# Patient Record
Sex: MALE | Race: BLACK OR AFRICAN AMERICAN | NOT HISPANIC OR LATINO
[De-identification: names, ages, dates, MRNs, and addresses within clinical notes are randomized per-mention and may not be internally consistent; named-entity substitution may affect disease eponyms.]

---

## 2017-01-06 ENCOUNTER — APPOINTMENT (OUTPATIENT)
Dept: PULMONOLOGY | Facility: CLINIC | Age: 62
End: 2017-01-06

## 2017-03-24 ENCOUNTER — APPOINTMENT (OUTPATIENT)
Dept: PULMONOLOGY | Facility: CLINIC | Age: 62
End: 2017-03-24

## 2019-08-16 ENCOUNTER — EMERGENCY (EMERGENCY)
Facility: HOSPITAL | Age: 64
LOS: 1 days | Discharge: HOME | End: 2019-08-16
Attending: EMERGENCY MEDICINE | Admitting: EMERGENCY MEDICINE
Payer: MEDICARE

## 2019-08-16 VITALS
HEIGHT: 68 IN | WEIGHT: 190.04 LBS | DIASTOLIC BLOOD PRESSURE: 105 MMHG | TEMPERATURE: 98 F | RESPIRATION RATE: 18 BRPM | OXYGEN SATURATION: 98 % | SYSTOLIC BLOOD PRESSURE: 180 MMHG | HEART RATE: 81 BPM

## 2019-08-16 VITALS — HEART RATE: 62 BPM | DIASTOLIC BLOOD PRESSURE: 92 MMHG | SYSTOLIC BLOOD PRESSURE: 175 MMHG

## 2019-08-16 PROCEDURE — 73080 X-RAY EXAM OF ELBOW: CPT | Mod: 26,LT

## 2019-08-16 PROCEDURE — 73080 X-RAY EXAM OF ELBOW: CPT

## 2019-08-16 PROCEDURE — 96372 THER/PROPH/DIAG INJ SC/IM: CPT

## 2019-08-16 PROCEDURE — 73080 X-RAY EXAM OF ELBOW: CPT | Mod: 26

## 2019-08-16 PROCEDURE — 99284 EMERGENCY DEPT VISIT MOD MDM: CPT | Mod: 25

## 2019-08-16 PROCEDURE — 99283 EMERGENCY DEPT VISIT LOW MDM: CPT | Mod: 25

## 2019-08-16 RX ORDER — ACETAMINOPHEN 500 MG
1000 TABLET ORAL ONCE
Refills: 0 | Status: COMPLETED | OUTPATIENT
Start: 2019-08-16 | End: 2019-08-16

## 2019-08-16 RX ORDER — IBUPROFEN 200 MG
1 TABLET ORAL
Qty: 30 | Refills: 0
Start: 2019-08-16 | End: 2019-08-25

## 2019-08-16 RX ORDER — LIDOCAINE 4 G/100G
1 CREAM TOPICAL ONCE
Refills: 0 | Status: COMPLETED | OUTPATIENT
Start: 2019-08-16 | End: 2019-08-16

## 2019-08-16 RX ORDER — KETOROLAC TROMETHAMINE 30 MG/ML
30 SYRINGE (ML) INJECTION ONCE
Refills: 0 | Status: DISCONTINUED | OUTPATIENT
Start: 2019-08-16 | End: 2019-08-16

## 2019-08-16 RX ADMIN — LIDOCAINE 1 PATCH: 4 CREAM TOPICAL at 08:13

## 2019-08-16 RX ADMIN — Medication 1000 MILLIGRAM(S): at 08:14

## 2019-08-16 RX ADMIN — Medication 30 MILLIGRAM(S): at 08:13

## 2019-08-16 RX ADMIN — Medication 30 MILLIGRAM(S): at 08:30

## 2019-08-16 NOTE — ED ADULT NURSE NOTE - CHPI ED NUR SYMPTOMS NEG
no dizziness/no weakness/no chills/no tingling/no fever/no nausea/no vomiting/no decreased eating/drinking

## 2019-08-16 NOTE — ED PROVIDER NOTE - CHIEF COMPLAINT
The patient is a 63y Male pmhx bph, htn, RA (on methotrexate) presenting to the ed with left arm pain.

## 2019-08-16 NOTE — ED ADULT NURSE REASSESSMENT NOTE - NS ED NURSE REASSESS COMMENT FT1
pt is awake, pain reassessment after pain med, pt states pain "is little bit better", pain increases with movement. pt is taken to X ray

## 2019-08-16 NOTE — ED ADULT TRIAGE NOTE - CHIEF COMPLAINT QUOTE
Presents to ED for Left arm pain and swelling.  Patient reports pain began 4 days ago and has worsened.  Denies CP, SOB, abd pain, numbness, tingling.  Patient has history of HTN and has not yet taken Carvedilol, and Amlodipine.

## 2019-08-16 NOTE — ED PROVIDER NOTE - PHYSICAL EXAMINATION
General: Patient is well developed and well nourised. Patient is alert and oriented to person, place and date. Patient is laying comfortably in stretcher and appears in no acute distress.  HEENT: Head is normocephalic and atraumatic. Pupils are equal, round and reactive. Extraocular movements intact. No evidence of nystagmus, conjunctival injection, or scleral icterus. External ears symmetric without evidence of discharge.  Nose is symmetric, non-tender, patent without evidence of discharge. Teeth in good repair. Uvula midline.   Neck: Supple with no evidence of lymphadenopathy.  Full range of motion.  Heart: Regular rate and rhythm. No murmurs, rubs or gallops.   Lungs: Clear to auscultation bilaterally with equal chest expansion. No note of wheezes, rhonchi, rales. Equal chest expansion. No note of retractions.  Musculoskeletal: decreased rom of the left elbow with flexion 2/2 pain. no point ttp. no edema of the elbow.  strength strongth, radial pulses 2+. distal sensation intact. ttp left cervical paraspinal muscles with spasm, no spinal ttp.  No edema, erythema, ecchymosis, atrophy or deformity. Full range of motion in all four extremities.  No clubbing or cyanosis. No point tenderness to palpation.   Neuro: GCS 15. Moving all extremities without discomfort. Strength is 5/5 arms and legs bilaterally. Sensation intact in all four extremities. gait steady   Skin: Warm, dry and intact without evidence of rashes, bruising, pallor, jaundice or cyanosis.   Psych: Mood and affect appropriate.

## 2019-08-16 NOTE — ED ADULT NURSE NOTE - PMH
HTN (hypertension)    Hypertension    Prediabetes    RA (rheumatoid arthritis)    Rheumatoid arthritis

## 2019-08-16 NOTE — ED ADULT NURSE NOTE - OBJECTIVE STATEMENT
Patient came in to ED c/o L arm swelling and L shoulder pain. Denies trauma. Denies CP, no SOB. No other c/o voiced. Per patient swelling and pain started last week with no improvement.

## 2019-08-16 NOTE — ED PROVIDER NOTE - ATTENDING CONTRIBUTION TO CARE
63M hx of RA on methotrexate, htn, bph, here today w/ 1 week of atraumatic L elbow pain and L neck pain. no injury/trauma, no numbness/tingling, no weakness. sensation intact, radial pulses intact, ROM intact, +pain w/ flexion of elbow, no deformities or swelling seen, no bony tenderness. xr shows osteophyte. plan for pcp/ortho follow up. doubt septic joint at this time.

## 2019-08-16 NOTE — ED PROVIDER NOTE - CLINICAL SUMMARY MEDICAL DECISION MAKING FREE TEXT BOX
This is a pleasant 63 year old male pmhx RA, HTN, BPH presenting to the ed with 8 days of atraumatic left elbow pain. states he has been taking mtx, prednisone and aspirin without relief of symptoms. physical exma, patient appears well, non-toxic. pain with flexion at the left elbow. full ROM of the left shoulder. ttp left certvical paraspinal muscles. plan for medication and imaging of the left elbow. This is a pleasant 63 year old male pmhx RA, HTN, BPH presenting to the ed with 8 days of atraumatic left elbow pain. states he has been taking mtx, prednisone and aspirin without relief of symptoms. no fevers.  physical exam, patient appears well, non-toxic. pain with flexion at the left elbow. full ROM of the left shoulder. ttp left certvical paraspinal muscles. plan for medication and imaging of the left elbow which shows a joint effusion, no fracture. given no fevers and ability to range the elbow I believe that the patient is stable for d/c home. I do not believe aspiration necessitated at this time. recommend follow up with ortho, and rheumatology. At this time, the evidence for any other entities in the differential is insufficient to justify any further testing. This was discussed and explained to the patient. It was advised that persistent or worsening symptoms would require further evaluation. This was discussed with the patient and family using shared decision making. ED evaluation and management discussed with the patient and family (if available) in detail.  Close PMD follow up encouraged.  Strict ED return instructions discussed in detail and patient given the opportunity to ask any questions about their discharge diagnosis and instructions. Patient verbalized understanding. Patient is agreeable to plan.

## 2019-08-16 NOTE — ED PROVIDER NOTE - NSFOLLOWUPINSTRUCTIONS_ED_ALL_ED_FT
please take ibuprofen for pain as prescribed    please take tylenol extra strength for pain as well    please follow up with your rheumatologist     please come back to er for any worsening of symptoms, worsening pain, inability to bend at the elbow or fevers    Joint Pain  Image   Joint pain can be caused by many things. It is likely to go away if you follow instructions from your doctor for taking care of yourself at home. Sometimes, you may need more treatment.    Follow these instructions at home:  Managing pain, stiffness, and swelling     Image   If told, put ice on the painful area.  Put ice in a plastic bag.  Place a towel between your skin and the bag.  Leave the ice on for 20 minutes, 2–3 times a day.  If told, put heat on the painful area. Do this as often as told by your doctor. Use the heat source that your doctor recommends, such as a moist heat pack or a heating pad.  Place a towel between your skin and the heat source.  Leave the heat on for 20–30 minutes.  Take off the heat if your skin gets bright red. This is especially important if you are unable to feel pain, heat, or cold. You may have a greater risk of getting burned.  Move your fingers or toes below the painful joint often. This helps with stiffness and swelling.  If possible, raise (elevate) the painful joint above the level of your heart while you are sitting or lying down. To do this, try putting a few pillows under the painful joint.  Activity     Rest the painful joint for as long as told. Do not do things that cause pain or make your pain worse.  Begin exercising or stretching the affected area, as told by your doctor. Ask your doctor what types of exercise are safe for you.  If you have an elastic bandage, sling, or splint:     Wear the device as told by your doctor. Take it off only as told by your doctor.  Loosen the device if your fingers or toes below the joint:  Tingle.   Lose feeling (get numb).   Get cold and blue.  Keep the device clean.   Ask your doctor if you should take off the device before bathing. You may need to cover it with a watertight covering when you take a bath or a shower.  General instructions     Take over-the-counter and prescription medicines only as told by your doctor.  Do not use any products that contain nicotine or tobacco. These include cigarettes and e-cigarettes. If you need help quitting, ask your doctor.  Keep all follow-up visits as told by your doctor. This is important.  Contact a doctor if:  You have pain that gets worse and does not get better with medicine.  Your joint pain does not get better in 3 days.  You have more bruising or swelling.  You have a fever.  You lose 10 lb (4.5 kg) or more without trying.  Get help right away if:  You cannot move the joint.  Your fingers or toes tingle, lose feeling, or get cold and blue.  You have a fever along with a joint that is red, warm, and swollen.  Summary  Joint pain can be caused by many things. It often goes away if you follow instructions from your doctor for taking care of yourself at home.  Rest the painful joint for as long as told. Do not do things that cause pain or make your pain worse.  Take over-the-counter and prescription medicines only as told by your doctor.  This information is not intended to replace advice given to you by your health care provider. Make sure you discuss any questions you have with your health care provider.    Document Released: 12/06/2010 Document Revised: 10/03/2018 Document Reviewed: 10/03/2018  [employer] Interactive Patient Education © 2019 [employer]

## 2019-08-16 NOTE — ED PROVIDER NOTE - DIAGNOSTIC INTERPRETATION
ER Physician Assistant:  INTERPRETATION: no acute fracture; joint effusion no soft tissue swelling noted; normal bony alignment.

## 2019-08-16 NOTE — ED PROVIDER NOTE - CARE PROVIDER_API CALL
Gregorio Butler)  Orthopaedic Surgery  [address] [place] [address]  Phone: [phone]  Fax: [phone]  Follow Up Time:

## 2019-08-16 NOTE — ED PROVIDER NOTE - OBJECTIVE STATEMENT
states that the pain began last week. states that he was seen by his PMD who started him on prednisone in addition to MTX. states that he has been taking aspirin for pain. no numbness tingling weakness, injury or trauma. states that the pain is also him his back. does not endorse chest pain palpitations cough shortness of breath nausea vomiting or diarrhea. no pain elsewhere on the body.

## 2019-08-20 DIAGNOSIS — M54.9 DORSALGIA, UNSPECIFIED: ICD-10-CM

## 2019-08-20 DIAGNOSIS — M79.602 PAIN IN LEFT ARM: ICD-10-CM

## 2019-08-20 DIAGNOSIS — I10 ESSENTIAL (PRIMARY) HYPERTENSION: ICD-10-CM

## 2019-08-20 DIAGNOSIS — M25.522 PAIN IN LEFT ELBOW: ICD-10-CM

## 2019-08-20 DIAGNOSIS — Z79.899 OTHER LONG TERM (CURRENT) DRUG THERAPY: ICD-10-CM

## 2023-01-02 ENCOUNTER — EMERGENCY (EMERGENCY)
Facility: HOSPITAL | Age: 68
LOS: 1 days | Discharge: HOME | End: 2023-01-02
Admitting: EMERGENCY MEDICINE
Payer: MEDICARE

## 2023-01-02 VITALS
DIASTOLIC BLOOD PRESSURE: 80 MMHG | HEART RATE: 65 BPM | OXYGEN SATURATION: 99 % | WEIGHT: 179.9 LBS | RESPIRATION RATE: 18 BRPM | SYSTOLIC BLOOD PRESSURE: 138 MMHG | TEMPERATURE: 97 F | HEIGHT: 68 IN

## 2023-01-02 LAB
FLUAV AG NPH QL: SIGNIFICANT CHANGE UP
FLUBV AG NPH QL: SIGNIFICANT CHANGE UP
RSV RNA NPH QL NAA+NON-PROBE: SIGNIFICANT CHANGE UP
SARS-COV-2 RNA SPEC QL NAA+PROBE: SIGNIFICANT CHANGE UP

## 2023-01-02 PROCEDURE — 99283 EMERGENCY DEPT VISIT LOW MDM: CPT

## 2023-01-02 PROCEDURE — 87637 SARSCOV2&INF A&B&RSV AMP PRB: CPT

## 2023-01-02 PROCEDURE — 99284 EMERGENCY DEPT VISIT MOD MDM: CPT

## 2023-01-02 RX ORDER — FLUTICASONE PROPIONATE 50 MCG
1 SPRAY, SUSPENSION NASAL
Qty: 1 | Refills: 0
Start: 2023-01-02

## 2023-01-02 RX ORDER — DEXTROMETHORPHAN/PSEUDOEPHED 7.5-15MG/5
1 SYRUP ORAL
Qty: 15 | Refills: 0
Start: 2023-01-02

## 2023-01-02 NOTE — ED ADULT TRIAGE NOTE - ESI TRIAGE ACUITY LEVEL, MLM
4 Cyclophosphamide Pregnancy And Lactation Text: This medication is Pregnancy Category D and it isn't considered safe during pregnancy. This medication is excreted in breast milk.

## 2023-01-02 NOTE — ED PROVIDER NOTE - NSICDXPASTMEDICALHX_GEN_ALL_CORE_FT
PAST MEDICAL HISTORY:  HTN (hypertension)     Hypertension     Prediabetes     RA (rheumatoid arthritis)     Rheumatoid arthritis

## 2023-01-02 NOTE — ED PROVIDER NOTE - PHYSICAL EXAMINATION
Vitals reviewed  Gen: well appearing, nad, speaking in full sentences  Skin: wwp, no rash/lesions  HEENT: ncat, no sinus ttp, eomi, mmm, normal posterior OP, b/l TMs w/ effusion, no bulging or erythema   CV: rrr, no audible m/r/g  Resp: symmetrical expansion, ctab, no w/r/r  Ext: FROM throughout, no peripheral edema  Neuro: alert/oriented, no focal deficits, steady gait

## 2023-01-02 NOTE — ED PROVIDER NOTE - OBJECTIVE STATEMENT
67 M pmh htn, RA p/w nasal congestion x one week.  pt reports nasal congestion w/ thick whitish/yellow dc when he blows his nose hard.  also w/ pressure in both ears and difficulty breathing through his nose.  no chest pain/sob/cough or sore throat.  pt tried afrin w/o relief.  denies f/c, headache, dizziness, fainting, abd pain, nv, sick contacts, travel, trauma. pt fully vaccinated for covid/flu

## 2023-01-02 NOTE — ED ADULT NURSE NOTE - CHIEF COMPLAINT QUOTE
Patient c/o of nasal congestion, stuffy nose X 1 week, states having a hard time breathing, no fever or chills, no cough.  No difficulty speaking in long sentences. O2 sat 99% on RA.

## 2023-01-02 NOTE — ED PROVIDER NOTE - PATIENT PORTAL LINK FT
You can access the FollowMyHealth Patient Portal offered by [facility] by registering at the following website: [email or web address]. By joining [facility]’s FollowMyHealth portal, you will also be able to view your health information using other applications (apps) compatible with our system.

## 2023-01-02 NOTE — ED PROVIDER NOTE - CLINICAL SUMMARY MEDICAL DECISION MAKING FREE TEXT BOX
67 M pmh htn, RA p/w nasal congestion x one week.  pt afebrile, well appearing, no sinus ttp, b/l TMs w/ effusion, no bulging or erythema, normal posterior OP, lungs ctab, hrrr.  suspect viral sinusitis.  viral swab sent.  will dc w/ flonase, decongestant and supportive care.  discussed strict return parameters

## 2023-01-02 NOTE — ED PROVIDER NOTE - NSFOLLOWUPINSTRUCTIONS_ED_ALL_ED_FT
Use nasal spray as prescribed    Take decongestants as prescribed.    Please rest and remain well hydrated with plenty of fluids.  You can take motrin 600-800mg and tylenol 650mg every 3 hours, switching between the two for pain/bodyaches or fevers (>100.4F/>38C)    You can use over the counter nasal saline rinses or steam in hot shower to help congestion     Please call to arrange follow up with primary care doctor within one week    Sinusitis, Adult       Sinusitis is inflammation of your sinuses. Sinuses are hollow spaces in the bones around your face. Your sinuses are located:  •Around your eyes.      •In the middle of your forehead.      •Behind your nose.      •In your cheekbones.      Mucus normally drains out of your sinuses. When your nasal tissues become inflamed or swollen, mucus can become trapped or blocked. This allows bacteria, viruses, and fungi to grow, which leads to infection. Most infections of the sinuses are caused by a virus.    Sinusitis can develop quickly. It can last for up to 4 weeks (acute) or for more than 12 weeks (chronic). Sinusitis often develops after a cold.      What are the causes?    This condition is caused by anything that creates swelling in the sinuses or stops mucus from draining. This includes:  •Allergies.      •Asthma.      •Infection from bacteria or viruses.      •Deformities or blockages in your nose or sinuses.      •Abnormal growths in the nose (nasal polyps).      •Pollutants, such as chemicals or irritants in the air.      •Infection from fungi (rare).        What increases the risk?    You are more likely to develop this condition if you:  •Have a weak body defense system (immune system).      •Do a lot of swimming or diving.      •Overuse nasal sprays.      •Smoke.        What are the signs or symptoms?    The main symptoms of this condition are pain and a feeling of pressure around the affected sinuses. Other symptoms include:  •Stuffy nose or congestion.      •Thick drainage from your nose.      •Swelling and warmth over the affected sinuses.      •Headache.      •Upper toothache.      •A cough that may get worse at night.      •Extra mucus that collects in the throat or the back of the nose (postnasal drip).      •Decreased sense of smell and taste.      •Fatigue.      •A fever.      •Sore throat.      •Bad breath.        How is this diagnosed?    This condition is diagnosed based on:  •Your symptoms.      •Your medical history.      •A physical exam.    •Tests to find out if your condition is acute or chronic. This may include:  •Checking your nose for nasal polyps.      •Viewing your sinuses using a device that has a light (endoscope).      •Testing for allergies or bacteria.      •Imaging tests, such as an MRI or CT scan.        In rare cases, a bone biopsy may be done to rule out more serious types of fungal sinus disease.      How is this treated?    Treatment for sinusitis depends on the cause and whether your condition is chronic or acute.•If caused by a virus, your symptoms should go away on their own within 10 days. You may be given medicines to relieve symptoms. They include:  •Medicines that shrink swollen nasal passages (topical intranasal decongestants).       •Medicines that treat allergies (antihistamines).      •A spray that eases inflammation of the nostrils (topical intranasal corticosteroids).      •Rinses that help get rid of thick mucus in your nose (nasal saline washes).      •If caused by bacteria, your health care provider may recommend waiting to see if your symptoms improve. Most bacterial infections will get better without antibiotic medicine. You may be given antibiotics if you have:  •A severe infection.       •A weak immune system.        •If caused by narrow nasal passages or nasal polyps, you may need to have surgery.        Follow these instructions at home:    Medicines     •Take, use, or apply over-the-counter and prescription medicines only as told by your health care provider. These may include nasal sprays.      •If you were prescribed an antibiotic medicine, take it as told by your health care provider. Do not stop taking the antibiotic even if you start to feel better.        Hydrate and humidify      •Drink enough fluid to keep your urine pale yellow. Staying hydrated will help to thin your mucus.      •Use a cool mist humidifier to keep the humidity level in your home above 50%.      •Inhale steam for 10–15 minutes, 3–4 times a day, or as told by your health care provider. You can do this in the bathroom while a hot shower is running.      •Limit your exposure to cool or dry air.      Rest     •Rest as much as possible.      •Sleep with your head raised (elevated).      •Make sure you get enough sleep each night.        General instructions      •Apply a warm, moist washcloth to your face 3–4 times a day or as told by your health care provider. This will help with discomfort.      •Wash your hands often with soap and water to reduce your exposure to germs. If soap and water are not available, use hand .      • Do not smoke. Avoid being around people who are smoking (secondhand smoke).      •Keep all follow-up visits as told by your health care provider. This is important.        Contact a health care provider if:    •You have a fever.      •Your symptoms get worse.      •Your symptoms do not improve within 10 days.        Get help right away if:    •You have a severe headache.      •You have persistent vomiting.      •You have severe pain or swelling around your face or eyes.      •You have vision problems.      •You develop confusion.      •Your neck is stiff.      •You have trouble breathing.        Summary    •Sinusitis is soreness and inflammation of your sinuses. Sinuses are hollow spaces in the bones around your face.      •This condition is caused by nasal tissues that become inflamed or swollen. The swelling traps or blocks the flow of mucus. This allows bacteria, viruses, and fungi to grow, which leads to infection.      •If you were prescribed an antibiotic medicine, take it as told by your health care provider. Do not stop taking the antibiotic even if you start to feel better.      •Keep all follow-up visits as told by your health care provider. This is important.      This information is not intended to replace advice given to you by your health care provider. Make sure you discuss any questions you have with your health care provider.

## 2023-01-02 NOTE — ED ADULT NURSE NOTE - OBJECTIVE STATEMENT
Pt presents to ED C/O nasal congestion x 1 week. Pt states, " At night I feel like I can't breathe because my nose is so clogged'. Pt speaking full sentences, protecting own airway, reports trying Afrin spray with no relief.

## 2023-01-04 DIAGNOSIS — R73.03 PREDIABETES: ICD-10-CM

## 2023-01-04 DIAGNOSIS — M06.9 RHEUMATOID ARTHRITIS, UNSPECIFIED: ICD-10-CM

## 2023-01-04 DIAGNOSIS — Z79.82 LONG TERM (CURRENT) USE OF ASPIRIN: ICD-10-CM

## 2023-01-04 DIAGNOSIS — Z88.8 ALLERGY STATUS TO OTHER DRUGS, MEDICAMENTS AND BIOLOGICAL SUBSTANCES STATUS: ICD-10-CM

## 2023-01-04 DIAGNOSIS — R09.81 NASAL CONGESTION: ICD-10-CM

## 2023-01-04 DIAGNOSIS — H93.8X3 OTHER SPECIFIED DISORDERS OF EAR, BILATERAL: ICD-10-CM

## 2023-01-04 DIAGNOSIS — Z91.041 RADIOGRAPHIC DYE ALLERGY STATUS: ICD-10-CM

## 2023-01-04 DIAGNOSIS — Z20.822 CONTACT WITH AND (SUSPECTED) EXPOSURE TO COVID-19: ICD-10-CM

## 2023-01-04 DIAGNOSIS — I10 ESSENTIAL (PRIMARY) HYPERTENSION: ICD-10-CM

## 2023-01-04 DIAGNOSIS — J32.9 CHRONIC SINUSITIS, UNSPECIFIED: ICD-10-CM

## 2023-09-13 NOTE — ED ADULT TRIAGE NOTE - CHIEF COMPLAINT QUOTE
with patient Patient c/o of nasal congestion, stuffy nose X 1 week, states having a hard time breathing, no fever or chills, no cough.  No difficulty speaking in long sentences. O2 sat 99% on RA.

## 2024-01-09 ENCOUNTER — OUTPATIENT (OUTPATIENT)
Dept: OUTPATIENT SERVICES | Facility: HOSPITAL | Age: 69
LOS: 1 days | End: 2024-01-09

## 2024-01-09 ENCOUNTER — APPOINTMENT (OUTPATIENT)
Dept: ULTRASOUND IMAGING | Facility: CLINIC | Age: 69
End: 2024-01-09
Payer: MEDICARE

## 2024-01-09 PROCEDURE — 76770 US EXAM ABDO BACK WALL COMP: CPT | Mod: 26
